# Patient Record
(demographics unavailable — no encounter records)

---

## 2024-10-11 NOTE — ADDENDUM
[FreeTextEntry1] : Documented by Nataly Johnson acting as a scribe for Dr. Mitra Thakur. 10/10/2024   All medical record entries made by the scribe were at my, Dr. Mitra Thakur, direction and personally dictated by me on 10/10/2024. I have reviewed the chart and agree that the record accurately reflects my personal performance of the history, physical exam, assessment and plan. I have also personally directed, reviewed, and agreed with the chart.

## 2024-10-11 NOTE — ASSESSMENT
[FreeTextEntry1] :  Elevated BP today EKG: sinus rhythm @ 84bpm start HCTZ 12.5mg daily - Rx sent to pharmacy. reinforced the importance of following a low sodium diet/increased physical activity. advised avoiding cold decongestants pt instructed to check BP at home, keep BP log and bring it on her next visit  Hx of elevated cholesterol reinforced the importance of following a low cholesterol/low fat diet and increased physical activity. We'll check Lipid panel today.  Hx of kidney stones following with urology (Dr. Street) every 6 months - next luca't in Nov  Osteopenia cont calcium and Vit D   Blood work ordered. follow up in 2 weeks for BP check and lab results

## 2024-10-11 NOTE — PHYSICAL EXAM
[No Acute Distress] : no acute distress [Well-Appearing] : well-appearing [Normal Sclera/Conjunctiva] : normal sclera/conjunctiva [Normal Outer Ear/Nose] : the outer ears and nose were normal in appearance [No JVD] : no jugular venous distention [No Lymphadenopathy] : no lymphadenopathy [Supple] : supple [No Respiratory Distress] : no respiratory distress  [No Accessory Muscle Use] : no accessory muscle use [Clear to Auscultation] : lungs were clear to auscultation bilaterally [Normal Rate] : normal rate  [Regular Rhythm] : with a regular rhythm [Normal S1, S2] : normal S1 and S2 [No Murmur] : no murmur heard [Pedal Pulses Present] : the pedal pulses are present [No Edema] : there was no peripheral edema [No Extremity Clubbing/Cyanosis] : no extremity clubbing/cyanosis [Soft] : abdomen soft [Non Tender] : non-tender [Non-distended] : non-distended [Normal Posterior Cervical Nodes] : no posterior cervical lymphadenopathy [Normal Anterior Cervical Nodes] : no anterior cervical lymphadenopathy [No CVA Tenderness] : no CVA  tenderness [No Spinal Tenderness] : no spinal tenderness [No Joint Swelling] : no joint swelling [Grossly Normal Strength/Tone] : grossly normal strength/tone [No Rash] : no rash [Coordination Grossly Intact] : coordination grossly intact [No Focal Deficits] : no focal deficits [Normal Gait] : normal gait [Normal Affect] : the affect was normal [Normal Insight/Judgement] : insight and judgment were intact [Normal Bowel Sounds] : normal bowel sounds

## 2024-10-11 NOTE — HISTORY OF PRESENT ILLNESS
[de-identified] : Ms. TRACIE FLORES is a 65 year old female with Hx of kidney stones, elevated cholesterol, osteopenia, who presents for follow up visit   Pt states she has been having headaches recently. Checking her BP at home and getting elevated readings. Takes Tylenol prn.  Denies CP, palpitations. Pt c/o sore throat that started yesterday. She went to [facility]. Had some chest congestion, and was started on Zpack and Azelastine nasal spray. Sx has improved. Denies any fever, chills, SOB, abdominal pain, N/V/D, dizziness, or leg swelling. Reports compliance with taking her meds daily.

## 2024-10-11 NOTE — HISTORY OF PRESENT ILLNESS
[de-identified] : Ms. TRACIE FLORES is a 65 year old female with Hx of kidney stones, elevated cholesterol, osteopenia, who presents for follow up visit   Pt states she has been having headaches recently. Checking her BP at home and getting elevated readings. Takes Tylenol prn.  Denies CP, palpitations. Pt c/o sore throat that started yesterday. She went to [facility]. Had some chest congestion, and was started on Zpack and Azelastine nasal spray. Sx has improved. Denies any fever, chills, SOB, abdominal pain, N/V/D, dizziness, or leg swelling. Reports compliance with taking her meds daily.

## 2024-10-24 NOTE — PHYSICAL EXAM
[No Acute Distress] : no acute distress [Well-Appearing] : well-appearing [Normal Sclera/Conjunctiva] : normal sclera/conjunctiva [Normal Outer Ear/Nose] : the outer ears and nose were normal in appearance [No JVD] : no jugular venous distention [No Lymphadenopathy] : no lymphadenopathy [Supple] : supple [No Respiratory Distress] : no respiratory distress  [No Accessory Muscle Use] : no accessory muscle use [Clear to Auscultation] : lungs were clear to auscultation bilaterally [Normal Rate] : normal rate  [Regular Rhythm] : with a regular rhythm [Normal S1, S2] : normal S1 and S2 [No Murmur] : no murmur heard [Pedal Pulses Present] : the pedal pulses are present [No Edema] : there was no peripheral edema [No Extremity Clubbing/Cyanosis] : no extremity clubbing/cyanosis [Soft] : abdomen soft [Non Tender] : non-tender [Non-distended] : non-distended [Normal Posterior Cervical Nodes] : no posterior cervical lymphadenopathy [Normal Anterior Cervical Nodes] : no anterior cervical lymphadenopathy [No CVA Tenderness] : no CVA  tenderness [No Joint Swelling] : no joint swelling [Grossly Normal Strength/Tone] : grossly normal strength/tone [No Rash] : no rash [Coordination Grossly Intact] : coordination grossly intact [No Focal Deficits] : no focal deficits [Normal Gait] : normal gait [Normal Affect] : the affect was normal [Normal Insight/Judgement] : insight and judgment were intact

## 2024-10-25 NOTE — HISTORY OF PRESENT ILLNESS
[de-identified] : Ms. TRACIE FLORES is a 65 year old female with Hx of kidney stones, elevated cholesterol, osteopenia, who presents for follow up on BP and lab results..  Pt was started on Hctz 12.5mg on her last visit. Reports compliance with taking it daily. Has been checking her BP at home. Pt states she is feeling well. Offers no complaints. Denies any SOB, CP, abdominal pain, N/V/D, headache, dizziness, or leg swelling.

## 2024-10-25 NOTE — ADDENDUM
[FreeTextEntry1] : Documented by Nataly Johnson acting as a scribe for Dr. Mitra Thakur. 10/24/2024   All medical record entries made by the scribe were at my, Dr. Mitra Thakur, direction and personally dictated by me on 10/24/2024. I have reviewed the chart and agree that the record accurately reflects my personal performance of the history, physical exam, assessment and plan. I have also personally directed, reviewed, and agreed with the chart.

## 2024-10-25 NOTE — HISTORY OF PRESENT ILLNESS
[de-identified] : Ms. TRACIE FLORES is a 65 year old female with Hx of kidney stones, elevated cholesterol, osteopenia, who presents for follow up on BP and lab results..  Pt was started on Hctz 12.5mg on her last visit. Reports compliance with taking it daily. Has been checking her BP at home. Pt states she is feeling well. Offers no complaints. Denies any SOB, CP, abdominal pain, N/V/D, headache, dizziness, or leg swelling.

## 2024-10-25 NOTE — ASSESSMENT
[FreeTextEntry1] : Follow up  flu vaccine administered today  BP is stable today -She has been checking BP at home, brought BP log - log reviewed -It has been running 114/71-120s/low 80s cont Hctz 12.5mg daily reinforced the importance of following a low sodium diet/increased physical activity.  Hx of kidney stones following with urology (Dr. Street) every 6 months - next luca't in Nov  Osteopenia cont calcium and Vit D  lab results reviewed and discussed with patient  Slightly elevated cholesterol reinforced the importance of following a low cholesterol/low fat diet and increased physical activity.  Slightly elevated WBC -pt states she had a little sore throat  Slightly elevated ferritin levels Pt not on iron suppl we'll cont to monitor
[FreeTextEntry1] : Follow up  flu vaccine administered today  BP is stable today -She has been checking BP at home, brought BP log - log reviewed -It has been running 114/71-120s/low 80s cont Hctz 12.5mg daily reinforced the importance of following a low sodium diet/increased physical activity.  Hx of kidney stones following with urology (Dr. Street) every 6 months - next luca't in Nov  Osteopenia cont calcium and Vit D  lab results reviewed and discussed with patient  Slightly elevated cholesterol reinforced the importance of following a low cholesterol/low fat diet and increased physical activity.  Slightly elevated WBC -pt states she had a little sore throat  Slightly elevated ferritin levels Pt not on iron suppl we'll cont to monitor
(0) No abnormality

## 2024-11-12 NOTE — HISTORY OF PRESENT ILLNESS
[FreeTextEntry1] : Very pleasant 65-year-old woman who presents for follow-up of kidney stones.  She underwent a renal ultrasound today demonstrating bilateral nonobstructing intrarenal stones.  She reports mild discomfort recently after which she passed a kidney stone.  She brings it in for analysis today.  She denies flank pain.  No hematuria.  No dysuria at this time.

## 2024-11-12 NOTE — ASSESSMENT
[FreeTextEntry1] : Very pleasant 65-year-old woman who presents for follow-up of kidney stones -Ultrasound images reviewed demonstrating bilateral nonobstructing intrarenal stones but no hydronephrosis nor renal masses -Calculus analysis -I recommended that she follow-up with a nephrologist who specializes in treatment of kidney stone disease and I have provided her with a reference to do so -Repeat renal ultrasound in 6 months and follow-up at that time -We again discussed surgical options for kidney stones.  She would like to avoid surgery at this time if possible  Patient is being seen today for evaluation and management of a chronic and longitudinal ongoing condition and I am of the primary treating physician   I have spent 21 minutes on this encounter, exclusive of separately billed services

## 2024-11-18 NOTE — END OF VISIT
[FreeTextEntry3] : This note was written by Hannah Alexander on 11/18/2024 actively solely Barbara Hermosillo M.D.  All medical record entries made by this scribe at my, Barbara Hermosillo M.D direction and personally dictated by me on 11/18/2024. I have personally reviewed the chart and agree that the record reflects my personal performance of the history, physical exam, assessment, and plan.

## 2024-11-18 NOTE — HISTORY OF PRESENT ILLNESS
[FreeTextEntry1] : 65 year old  female presents for annual GYN exam. Pt reports she still has hot flashes, pt reports she was on Pranav Pro for a while. Pt reports she uses the generic Vagifem and it helps.   Of note, PHQ9 - 0 [Y] : Patient is sexually active [Monogamous (Male Partner)] : is monogamous with a male partner [Mammogramdate] : 06/2024 [BreastSonogramDate] : 11/2023 [PapSmeardate] : 10/2022 [BoneDensityDate] : 10/2022 [ColonoscopyDate] : 09/2015 [PGxTotal] : 1 [[facility] Center[facility]ulerm] : 1 [[facility] Hospitaliving] : 1

## 2024-11-18 NOTE — PLAN
[FreeTextEntry1] : - Referral for bone density, mammo and breast sono given.  - Rx for Veozah given, pt aware if she takes Veozah she has to get her Liver enzymes checked in a month.  - RTO for 2 year annual/ PRN. The patient has been counseled regarding the following topics: patient screened for depression - no signs of clinical depression. PHQ9 scores reviewed over the course of the visit. 5-10 minutes of face to face time.

## 2024-11-18 NOTE — PHYSICAL EXAM
[Chaperone Present] : A chaperone was present in the examining room during all aspects of the physical examination [Appropriately responsive] : appropriately responsive [Alert] : alert [No Acute Distress] : no acute distress [No Lymphadenopathy] : no lymphadenopathy [Soft] : soft [Non-tender] : non-tender [Non-distended] : non-distended [No HSM] : No HSM [No Lesions] : no lesions [No Mass] : no mass [Oriented x3] : oriented x3 [Examination Of The Breasts] : a normal appearance [No Discharge] : no discharge [No Masses] : no breast masses were palpable [Labia Majora] : normal [Labia Minora] : normal [Normal] : normal [Uterine Adnexae] : normal [29282] : A chaperone was present during the pelvic exam. [FreeTextEntry2] : MILTON Han

## 2024-12-16 NOTE — ASSESSMENT
[FreeTextEntry1] : Annual wellness  UTD with mammogram, PAP, bone density scan referred to GI for colonoscopy  mammogram (12/2024) results reviewed and discussed with pt:  left breast asymmetry, stable or slightly decreased in conspicuity, otherwise unremarkable  decreased hearing referred to ENT  Hot flashes cont Veozah follows with GYN we'll check LFTs today  Hx of kidney stones following with urology (Dr. Street) every 6 months  Osteopenia cont calcium and Vit D  Slightly elevated cholesterol reinforced the importance of following a low cholesterol/low fat diet and increased physical activity. We'll check Lipid panel today.  Slightly elevated ferritin levels Pt not on iron suppl we'll check iron studies and ferritin level  today  Blood work ordered. Follow up in one week for lab results

## 2024-12-16 NOTE — HEALTH RISK ASSESSMENT
[Good] : ~his/her~  mood as  good [No] : In the past 12 months have you used drugs other than those required for medical reasons? No [No falls in past year] : Patient reported no falls in the past year [Little interest or pleasure doing things] : 1) Little interest or pleasure doing things [Feeling down, depressed, or hopeless] : 2) Feeling down, depressed, or hopeless [0] : 2) Feeling down, depressed, or hopeless: Not at all (0) [PHQ-2 Negative - No further assessment needed] : PHQ-2 Negative - No further assessment needed [Never] : Never [NO] : No [Patient reported mammogram was normal] : Patient reported mammogram was normal [Patient reported PAP Smear was normal] : Patient reported PAP Smear was normal [Patient reported colonoscopy was normal] : Patient reported colonoscopy was normal [With Family] : lives with family [# of Members in Household ___] :  household currently consist of [unfilled] member(s) [Employed] : employed [College] : College [] :  [# Of Children ___] : has [unfilled] children [Feels Safe at Home] : Feels safe at home [Fully functional (bathing, dressing, toileting, transferring, walking, feeding)] : Fully functional (bathing, dressing, toileting, transferring, walking, feeding) [Fully functional (using the telephone, shopping, preparing meals, housekeeping, doing laundry, using] : Fully functional and needs no help or supervision to perform IADLs (using the telephone, shopping, preparing meals, housekeeping, doing laundry, using transportation, managing medications and managing finances) [Reports changes in hearing] : Reports changes in hearing [Reports normal functional visual acuity (ie: able to read med bottle)] : Reports normal functional visual acuity [Smoke Detector] : smoke detector [Carbon Monoxide Detector] : carbon monoxide detector [Seat Belt] :  uses seat belt [Sunscreen] : uses sunscreen [de-identified] : Maintains active by doing household chores. [de-identified] : Admits her diet could be better.  [CEL9Rsywg] : 0 [Reports changes in vision] : Reports no changes in vision [Reports changes in dental health] : Reports no changes in dental health [Travel to Developing Areas] : does not  travel to developing areas [TB Exposure] : is not being exposed to tuberculosis [Caregiver Concerns] : does not have caregiver concerns [MammogramDate] : 12/12/2024 [MammogramComments] : follow up in 6 months [PapSmearDate] : 10/2022 [PapSmearComments] : was told to return in 3 years. [BoneDensityDate] : 12/2024 [ColonoscopyDate] : 09/2015 [BoneDensityComments] : Osteopenia [ColonoscopyComments] : Pt. requests a referral.  [FreeTextEntry3] : Has one daughter. [de-identified] : Decreased hearing. Requests an ENT referral. [de-identified] : Follows with an ophthalmologist.  [de-identified] : Follows with a dentist.  [FreeTextEntry4] : Doesn't have proxy in writing.

## 2024-12-16 NOTE — ADDENDUM
[FreeTextEntry1] : Documented by Nataly Johnson acting as a scribe for Dr. Mitra Thakur. 12/16/2024   All medical record entries made by the scribe were at my, Dr. Mitra Thakur, direction and personally dictated by me on 12/16/2024. I have reviewed the chart and agree that the record accurately reflects my personal performance of the history, physical exam, assessment and plan. I have also personally directed, reviewed, and agreed with the chart.

## 2024-12-16 NOTE — HISTORY OF PRESENT ILLNESS
[de-identified] : Ms. TRACIE FOLRES is a 65 year old female with Hx of kidney stones, elevated cholesterol, osteopenia, who presents for an annual wellness visit.  Pt states she is feeling well. Reports she has been experiencing decreased hearing. Denies any SOB, CP, abdominal pain, N/V/D, headache, dizziness, or leg swelling. She stopped taking Hctz a month ago, due to low BP readings at home. She is on Veozah for 3 months for intermittent hot flashes. Follows with GYN.

## 2025-02-06 NOTE — HISTORY OF PRESENT ILLNESS
[de-identified] : Ms. TRACIE FLORES is a 66 year old female with Hx of kidney stones, elevated cholesterol, osteopenia, who presents  Pt c/o HA for the past 2 weeks. BP has been elevated at home. Takes Tylenol/Motrin prn. Denies blurry vision, SOB, CP, dizziness.

## 2025-02-06 NOTE — ASSESSMENT
[FreeTextEntry1] : Follow up  HA - ? secondary to elevated BP  EKG: NSR @ 76bpm  start Amlodipine 2.5mg daily - Rx sent to pharmacy. Discussed the importance of following a low sodium diet pt instructed to check BP at home -keep BP log and bring it on her next visit referred for CT head referred to neurology  Hx of kidney stones following with urology (Dr. Street) every 6 months referred to nephrology  Osteopenia cont calcium and Vit D  Slightly elevated cholesterol reinforced the importance of following a low cholesterol/low fat diet and increased physical activity.  follow up in 3 weeks for BP check

## 2025-02-06 NOTE — ADDENDUM
[FreeTextEntry1] : Documented by Nataly Johnson acting as a scribe for Dr. Mitra Thakur. 02/06/2025   All medical record entries made by the scribe were at my, Dr. Mitra Thakur, direction and personally dictated by me on 02/06/2025. I have reviewed the chart and agree that the record accurately reflects my personal performance of the history, physical exam, assessment and plan. I have also personally directed, reviewed, and agreed with the chart.

## 2025-02-06 NOTE — HISTORY OF PRESENT ILLNESS
[de-identified] : Ms. TRACIE FLORES is a 66 year old female with Hx of kidney stones, elevated cholesterol, osteopenia, who presents  Pt c/o HA for the past 2 weeks. BP has been elevated at home. Takes Tylenol/Motrin prn. Denies blurry vision, SOB, CP, dizziness.

## 2025-02-06 NOTE — PHYSICAL EXAM

## 2025-02-10 NOTE — PLAN
[TextEntry] : Hypertension-patient with uncontrolled blood pressure today.  Will increase amlodipine to 5 mg daily.  Based on 24-hour urine if she were to have hypercalciuria would benefit from thiazide diuretic.  Will have to reassess after she is able to complete urine.  Nephrolithiasis with osteopenia-patient with long history of recurrent kidney stones that began during pregnancy.  Patient states that she does not drink enough.  She always has calcium oxalate stones.  Will check intact PTH and vitamin D levels.  Along with kidney function panel.  Will rule out hyperparathyroidism.  Prescription given for patient to complete 24-hour urine Litholink link test.    Patient plans on doing test sometime at the end of this month to early March.  Will follow-up with me in April.

## 2025-02-10 NOTE — CURRENT MEDS
[TextEntry] : Veozah 45mg amlodipine 2.5mg  Vagifem twice a week OTC Calcium 600mg po bid Vit D 2000iu qd

## 2025-02-10 NOTE — HISTORY OF PRESENT ILLNESS
[FreeTextEntry1] : 66F with HTN, menopausal, recurrent kidney stones here to establish care for prevention.  Her first stone was in April of 1997 when she was 7 months pregnant. She went to the ED and was found to have a stone. She stayed overnight and passed it. Her second was in the fall of 2011, she had pain in the R flank and she went to the ED at [facility]. She was told to follow up with a urologist. She saw someone in the [place] and she had a LTS and she passed a stone. Her pain resolved. She started seeing Dr. Street in 2019 bc she was found to have blood in her urine on an annual visit. She had imaging and it revealed hydronephrosis on the R side. In Jan 2020 she has a URS/cysto and stone removal from the ureter. She had stones on both sides.  In Nov 2020 she had a repeat procedure bc of stones on the L side but she had both evaluated with URS/cysto and stone removal again.  Since then she has not had any procedures but follows with Dr. Street for US every six months. In April of 2023 she had a urethral stone, she had urgency and urine couldn't pass and she went to the ED at [facility]. It was manually removed. In Oct 2024 she had another urethral stone but she was still able to pass a little urine. She was able to pass that stone the following day.  All stones have been calcium oxalate. Her last CT was in Nov 2023.   She currently has no flank pain, no gross hematuria. No urinary urgency, no dysuria. No LE Swelling. No shortness of breath.

## 2025-02-24 NOTE — ADDENDUM
[FreeTextEntry1] : Documented by Shaye Kapoor acting as a scribe for Dr. Mitra Thakur. 02/24/2025  All medical records entries made by the scribe were at my, Dr. Thakur, direction and personally dictated by me on 02/24/2025. I have reviewed the chart and agree that the record accurately reflects my personal performance of the history, physical exam, assessment and plan. I have also personally directed, reviewed, and agreed with the chart.

## 2025-02-24 NOTE — HISTORY OF PRESENT ILLNESS
[de-identified] :  Ms. TRACIE FLORES is a 66 year old female with hx of kidney stones, elevated cholesterol, osteopenia, presenting for a follow up on BP. Was started Amlodipine 2.5mg daily on last visit - Saw nephrology and says her BP was still high and Amlodipine was increased to 5mg daily by nephrologist. Reports compliance with taking her meds daily. States headache on left side of head has improved since taking amlodipine. Has a dull like ache sometimes. Says Tylenol doesn't improve symptoms. Denies any SOB, CP, abdominal pain, N/V/D, dizziness, or leg swelling.

## 2025-02-24 NOTE — ASSESSMENT
[FreeTextEntry1] : Follow up on BP  HTN- BP - 120/76 today started Amlodipine 2.5mg daily on last visit - increased to 5mg daily by nephrologist. pt has been keeping a home BP log as instructed - reviewed today. BP at home has been running 121-130 / 70-82 pt to continue Amlodipine 5mg daily Discussed the importance of following a low sodium diet head CT scan results reviewed and discussed with pt  - no acute findings has upcoming appointment with neurology on Thursday  Elevated PTH referred to endocrinology   Hx of kidney stones following with urology (Dr. Street) every 6 months  Osteopenia cont calcium and Vit D  Slightly elevated cholesterol reinforced the importance of following a low cholesterol/low fat diet and increased physical activity.  menopausal symptoms on Veojah 45mg daily we'll check hepatic function panel today  bloodwork ordered follow up in one week for lab results

## 2025-04-22 NOTE — CURRENT MEDS
[TextEntry] : Veozah 45mg amlodipine 2.5mg Vagifem twice a week OTC Calcium 600mg po bid Vit D 2000iu qd Gabapentin 200mg qhs

## 2025-04-22 NOTE — PLAN
[TextEntry] : Pt with mild elev in iPTH but normal Ca and Vit D- Urine calcium normal. Referred to endo she has an elev iPTH and normal phos and Ca and calcium stones.  Pt with hypocitraturia - start potassium citrate 10meq bid. Will also try to eat more citrate containing foods and increase fluid intake to maintain at least 2L of urine output. Cont low salt diet. BP well controlled today. Repeat 24hr UA while on citrate and follow up .

## 2025-04-22 NOTE — HISTORY OF PRESENT ILLNESS
[FreeTextEntry1] : 66F with HTN, menopausal, recurrent kidney stones here to follow up for prevention.  All previous stones have been calcium oxalate. Her last CT was in Nov 2023.  Pt has been following with neurology for headaches and possible ? hemicrania.   She currently has no flank pain, no gross hematuria. No urinary urgency, no dysuria. No LE Swelling. No shortness of breath.

## 2025-04-28 NOTE — HISTORY OF PRESENT ILLNESS
[FreeTextEntry1] : 66 year  F here for assessment of elevated PTH and osteopenia   Patient with past medical hx as below, remarkable for:  hypocitraturia with kidney stones  Associated symptoms:  Fatigue: No Polyuria and polydipsia: No Abdominal pain:  No Vomiting:  No Bone pain: No Renal stones:  yes, hypocitraturia with kidney stones  Confusion: No Depression/memory impairment:  No   Known hx of malignancy: No Known hx of granulomatous diseases: No Associated medications (including thiazide-type diuretics and vitamin D): OTC vitamin D3 Hyperthyroidism:  No Hx of fractures as an adult: No

## 2025-04-28 NOTE — PHYSICAL EXAM
[Alert] : alert [Well Nourished] : well nourished [No Acute Distress] : no acute distress [Well Developed] : well developed [Normal Sclera/Conjunctiva] : normal sclera/conjunctiva [EOMI] : extra ocular movement intact [No Proptosis] : no proptosis [Normal Oropharynx] : the oropharynx was normal [Supple] : the neck was supple [Thyroid Not Enlarged] : the thyroid was not enlarged [No Respiratory Distress] : no respiratory distress [No Accessory Muscle Use] : no accessory muscle use [Clear to Auscultation] : lungs were clear to auscultation bilaterally [Normal S1, S2] : normal S1 and S2 [Normal Rate] : heart rate was normal [Regular Rhythm] : with a regular rhythm [No Edema] : no peripheral edema [Pedal Pulses Normal] : the pedal pulses are present [Normal Bowel Sounds] : normal bowel sounds [Not Tender] : non-tender [Not Distended] : not distended [Soft] : abdomen soft [Normal Anterior Cervical Nodes] : no anterior cervical lymphadenopathy [Normal Posterior Cervical Nodes] : no posterior cervical lymphadenopathy [No Spinal Tenderness] : no spinal tenderness [Spine Straight] : spine straight [No Stigmata of Cushings Syndrome] : no stigmata of Cushings Syndrome [Normal Gait] : normal gait [Normal Strength/Tone] : muscle strength and tone were normal [No Rash] : no rash [Normal Reflexes] : deep tendon reflexes were 2+ and symmetric [No Tremors] : no tremors [Oriented x3] : oriented to person, place, and time [Acanthosis Nigricans] : no acanthosis nigricans

## 2025-04-29 NOTE — ASSESSMENT
[FreeTextEntry1] : Very pleasant 65-year-old woman who presents for follow-up of kidney stones -Ultrasound images reviewed demonstrating stable appearance of bilateral nonobstructing intrarenal stones but no hydronephrosis nor renal masses -Continue potassium citrate -Repeat renal ultrasound in 6 months and follow-up at that time -We again discussed surgical options for kidney stones.  She would like to avoid surgery at this time if possible  Patient is being seen today for evaluation and management of a chronic and longitudinal ongoing condition and I am of the primary treating physician   I have spent 20 minutes on this encounter, exclusive of separately billed services